# Patient Record
(demographics unavailable — no encounter records)

---

## 2024-11-27 NOTE — CONSULT LETTER
[Dear  ___] : Dear  [unfilled], [Consult Letter:] : I had the pleasure of evaluating your patient, [unfilled]. [Please see my note below.] : Please see my note below. [This report is provisional, pending the completion of the evaluation.  A final diagnosis and plan will follow.] : This report is provisional, pending the completion of the evaluation.  A final diagnosis and plan will follow. [Consult Closing:] : Thank you very much for allowing me to participate in the care of this patient.  If you have any questions, please do not hesitate to contact me. [Sincerely,] : Sincerely, [FreeTextEntry3] : Judi Daugherty MD Attending Physician, Allergy and Immunology , [facility] Department of Medicine and Pediatrics [facility]/Division of Allergy and Immunology   [FreeTextEntry2] : Cresencio Dunaway MD [DrAdriano  ___] : Dr. NAVARRO [DrAdriano ___] : Dr. NAVARRO

## 2024-11-27 NOTE — HISTORY OF PRESENT ILLNESS
[Venom Reactions] : venom reactions [Food Allergies] : food allergies [de-identified] : PMD G[facility] Succcess Rheum Dr Yiemi [facility]to at [facility]  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    Hx of scleroderma, Reynaud's   - started with skin manifestations,  esophageal sx. takes omeprazole BID   ALLERGIC RHINOCONJUNCTIVITIS  no allergy sx. no itchy eyes, rhinorrhea,, nasal congestion, sneezing  had an inhaler when she was 10 yo Rxd montelukast when saw results.    ABNORMAL LUNG CT no cough, wheeze, sob significant respiratory exercise.  basement- dehumidifier, no leaks or floods.  never lived in the space where there was mold.   ATOPIC DERMATITIS  seeing derm previuosly but not recently  dx'd in 2022 with scleroderma. unsure  inside of face feels itchy  recently broke out all over face.   lactose intolerace  AMOXICILLIN ALLERGY  Full body rash - 15 years ago fine, small bumpy rash. no hives.  not with first dose.  got better on its own.

## 2024-11-27 NOTE — DATA REVIEWED
[FreeTextEntry1] : CT chest 1/2024- Patent trachea and bronchi. No bronchiectasis. Unchanged small groundglass nodules along distal airways in both lungs, preferential to the right lower lobe. Unchanged left lower lobe 4 mm solid nodule. No pleural effusion.  PFTs (11/2024) - personally reviewed by me;  mild obstruction, ,mild decrease in DLCO

## 2024-11-27 NOTE — HISTORY OF PRESENT ILLNESS
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    Hx of scleroderma, Reynaud's   - started with skin manifestations,  esophageal sx. takes omeprazole BID   ALLERGIC RHINOCONJUNCTIVITIS  no allergy sx. no itchy eyes, rhinorrhea,, nasal congestion, sneezing  had an inhaler when she was 10 yo Rxd montelukast when saw results.    ABNORMAL LUNG CT no cough, wheeze, sob significant respiratory exercise.  basement- dehumidifier, no leaks or floods.  never lived in the space where there was mold.   ATOPIC DERMATITIS  seeing derm previuosly but not recently  dx'd in 2022 with scleroderma. unsure  inside of face feels itchy  recently broke out all over face.   lactose intolerace  AMOXICILLIN ALLERGY  Full body rash - 15 years ago fine, small bumpy rash. no hives.  not with first dose.  got better on its own.

## 2024-11-27 NOTE — CONSULT LETTER
[Dear  ___] : Dear  [unfilled], [Consult Letter:] : I had the pleasure of evaluating your patient, [unfilled]. [Please see my note below.] : Please see my note below. [This report is provisional, pending the completion of the evaluation.  A final diagnosis and plan will follow.] : This report is provisional, pending the completion of the evaluation.  A final diagnosis and plan will follow. [Consult Closing:] : Thank you very much for allowing me to participate in the care of this patient.  If you have any questions, please do not hesitate to contact me. [Sincerely,] : Sincerely, [FreeTextEntry3] : Judi Daugherty MD Attending Physician, Allergy and Immunology , [facility] Department of Medicine and Pediatrics [facility]/Division of Allergy and Immunology   [FreeTextEntry2] : Cresencio Dunaway MD [DrAdriano  ___] : Dr. NAVARRO [DrAdriano ___] : Dr. NAVARRO

## 2024-11-27 NOTE — IMPRESSION
[_____] : grasses ([unfilled]) [Allergy Testing Mixed Feathers] : feathers [Allergy Testing Cockroach] : cockroach

## 2024-11-27 NOTE — REASON FOR VISIT
[Initial Consultation] : an initial consultation for [FreeTextEntry2] : elevated eosinophils, elevated IgE level, allergic rhinoconjunctivitis

## 2024-11-27 NOTE — SOCIAL HISTORY
[House] : [unfilled] lives in a house  [Central Forced Air] : heating provided by central forced air [Central] : air conditioning provided by central unit [Damp/Musty] : damp/musty [None] : none [Humidifier] : does not use a humidifier [Cockroaches] : Patient states that there are no cockroaches in the home [Dust Mite Covers] : does not have dust mite covers [Feather Pillows] : does not have feather pillows [Feather Comforter] : does not have a feather comforter [Bedroom] : not in the bedroom

## 2025-01-28 NOTE — HISTORY OF PRESENT ILLNESS
[de-identified] : Verbal consent given on 01/28/2025 at 01:31 PM  by  ALFONSO MONTILLA .    Things are status quo. Have been taking medication. Finished 30 days, and there was a lag between refill, and had headaches and nasal congestion. Takes Zyrtec daily, Flonase if she remembers. Prone to headaches and migraines and haven't had any since on Zyrtec.  Supposed to see Dr. Prabhakar next week to discuss plan. Had chest CT and echo a few weeks ago.

## 2025-01-28 NOTE — DATA REVIEWED
[FreeTextEntry1] : 1/2025 echo  1. Left ventricular systolic function is normal with an ejection fraction of 65 % by visual interpretation visually estimated at 60 to 65 %. 2. Mild mitral regurgitation. 3. Minimal to mild tricuspid regurgitation. 4. Estimated pulmonary artery systolic pressure is 26 mmHg. 5. Minimal pulmonic regurgitation. 6. Thinning of the inter-atrial septum.  1/2025 Chest CT  stable GGO

## 2025-01-28 NOTE — REASON FOR VISIT
[Routine Follow-Up] : a routine follow-up visit for [FreeTextEntry2] : allergic rhinoconjunctivitis, abnormal CT with positive hypersensitivity panel, eosinophilia and elevated IgE

## 2025-02-03 NOTE — REASON FOR VISIT
[Home] : at home, [unfilled] , at the time of the visit. [Medical Office: ([facility])___] : at the medical office located in  [Patient] : the patient [Follow-Up] : a follow-up visit [Shortness of Breath] : shortness of breath

## 2025-02-03 NOTE — REASON FOR VISIT
[Home] : at home, [unfilled] , at the time of the visit. [Medical Office: ([facility])___] : at the medical office located in  [Patient] : the patient [Follow-Up] : a follow-up visit [Shortness of Breath] : shortness of breath

## 2025-02-04 NOTE — END OF VISIT
[FreeTextEntry3] :   I, Dr. Vicky Prabhakar  personally performed the evaluation and management (E/M) services for this established patient who presents today with (a) new problem(s)/exacerbation of (an) existing condition(s). That E/M includes conducting the clinically appropriate interval history &/or exam, assessing all new/exacerbated conditions, and establishing a new plan of care. Today, my MARIAELENA, Ana Rosa Art NP, , was here to observe my evaluation and management service for this new problem/exacerbated condition and follow the plan of care established by me going forward. [Time Spent: ___ minutes] : I have spent [unfilled] minutes of time on the encounter which excludes teaching and separately reported services.

## 2025-02-04 NOTE — REVIEW OF SYSTEMS
[GERD] : gerd [Raynaud] : raynaud [Fever] : no fever [Dry Eyes] : no dry eyes [Cough] : no cough [Chest Discomfort] : no chest discomfort [Hay Fever] : no hay fever [Nocturia] : no nocturia [Arthralgias] : no arthralgias [Headache] : no headache [Depression] : no depression [Diabetes] : no diabetes

## 2025-02-04 NOTE — DISCUSSION/SUMMARY
[FreeTextEntry1] : ---Assessment plan----------The patient has been referred here for further opinion regarding pulmonary problem,  40-year-old lady history of scleroderma on nifedipine and myfortic, ILD  #SCLERODERMA PULMONARY INVOLVEMENT-----  CT 2025---No CT evidence of pulmonary fibrosis. Few small bilateral lower lobe nodular opacities are unchanged. We will continue to monitor with yearly CT scans  2---HAS EOSININOPHILIA AND ELEVATED  IgE .--ALSO HAS INDETERMINATE ANCA ---____ significance of this is not clear ---but I have asked her to discuss it with Dr. Aburto  her rheumatologist regarding possibility of EGPA [ Eosinophilic granulomatosis with polyangiitis (EGPA)   3]#scleroderma  --follows Dr Aburto every 6 m- on myfortic and nifedipine  4]-  #Chronic rhinitis  ---Pt complaining of allergic symptoms/congestion, rhinitis -- has some relief with daily zyrtec  --- ---HAS EOSININOPHILIA AND ELEVATED  IgE .--ALSO HAS INDETYERMINATE ANCA ---____ significance of this is not clear but I have asked her to discuss it with  Dr. Aburto her rheumatologist regarding possibility of EGPA [ Eosinophilic granulomatosis with polyangiitis (EGPA)   ---Will get CT sinuses given chronic rhinitis  ---Asthma profile in office Continue with zyrtec daily  RTO in 3-4 months for follow up  Thanks for allowing  me to participate  in the care of this patient.  Patient at this time  will follow  the above mentioned recommendations and return back for follow up visit. If you have any questions  I can be reached  at # [phone] (office).   Vicky Prabhakar MD, [facility]

## 2025-02-04 NOTE — PHYSICAL EXAM
[No Acute Distress] : no acute distress [Normal Oropharynx] : normal oropharynx [III] : Mallampati Class: III [No Neck Mass] : no neck mass [Normal S1, S2] : normal s1, s2 [No Abnormalities] : no abnormalities [Benign] : benign [Normal Gait] : normal gait [No Cyanosis] : no cyanosis [Normal Color/ Pigmentation] : normal color/ pigmentation [No Focal Deficits] : no focal deficits [Oriented x3] : oriented x3 [TextBox_68] : Decreased entry at bases

## 2025-02-04 NOTE — HISTORY OF PRESENT ILLNESS
[Never] : never [TextBox_4] : This letter  is regarding your patient  who  attended pulmonary out patient office today.  I have reviewed  patient's  past history, social history, family history and medication list. I also  reviewed nurse practitioners/ and fellows  notes and assessment and agree with it.   The patient was referred by Dr. ABURTO RHEUMATOLOGY  40-year-old lady history of scleroderma------------- with with CT scan suggestive of minimal ILD ------No history of , fever, chills , rigors, chest pain, or hemoptysis. Questionable history of Raynaud's phenomenon. No h/o significant weight loss in last few months. No history of liver dysfunction , collagen vascular disorder or chronic thromboembolic disease. I would classify the patient's dyspnea as WHO  FUNCTIONAL CLASS II--------  ----Echo  date-----[facility] moderate mitral regurgitation ----Pft date---------2022 pending --6MWT 440 METERS ---  CT CHEST - ORDERED BY: SARAH ABURTO PROCEDURE DATE: 09/03/2022 INTERPRETATION: CLINICAL INFORMATION: Scleroderma PROCEDURE: CT scan of the chest was obtained without intravenous contrast. FINDINGS: MEDIASTINUM/LYMPH NODES: No lymphadenopathy. The esophagus is mildly dilated throughout its course. HEART/VASCULATURE: The heart is normal in size. The aorta and pulmonary artery are normal in caliber. No coronary arterial calcification AIRWAYS/LUNGS/PLEURA: The central airways are patent. No pleural effusions. There are faint reticular opacities in the upper lobes anteriorly as well as small groundglass nodules in the dependent lower lobes. No bronchiectasis or honeycombing. UPPER ABDOMEN: Within normal limits. 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 Partially imaged posterior lumbar spinal fusion hardware. IMPRESSION: Faint reticular opacities in the upper lobes in a nondependent distribution; while nonspecific, question mild/early interstitial lung disease in this clinical setting. Attention on follow-up imaging.  Small dependent basilar groundglass opacities; while nonspecific, question aspiration in the setting of a patulous esophagus in this patient with reported scleroderma.   CT CHEST 1/2024 LUNGS/AIRWAYS/PLEURA: Patent trachea and bronchi. No bronchiectasis. Unchanged small groundglass nodules along distal airways in both lungs, preferential to the right lower lobe. Unchanged left lower lobe 4 mm solid nodule. No pleural effusion.----  CT 2025---No CT evidence of pulmonary fibrosis. Few small bilateral lower lobe nodular opacities are unchanged.     9/7: Exercise tolerance has been good. She increased her omeprazole to 40mg BID. Taking Mycophenolate takes 4 tablets a day. Systemically tolerating well. Taking nifedipine for Raynauds. Has not been taking the calcium because she has been having trouble taking it.  Needs exercise echo----   3/2024 scleroderma- follow heum Dr Aburto- on Nifedipine and Myfortic (5 tabs daily- unable to tolerate ordered 6 tab) No pulm complaints Follws GI Dr Reid annually- on pantoprazole but feels that its no longer working- c/o nausea Currently working as , has 3 young children at home--- no complaints from pulmonary point of view-----advised B12 and vitamin D supplementation---    02/2025 Scleroderma - following with rheum Dr. Aburto -- on Nifedipine and Myfortic  ----No significant pulmonary complaints Pt complaining of allergic symptoms/congestion, rhinitis -- has some relief with daily zyrtec ---HAS EOSININOPHILIA AND ELEVATED  IgE .--ALSO HAS INDETYERMINATE ANCA ---____ significance of this is not clear but I have asked her to discuss it with Dr. Baron and her rheumatologist regarding possibility of EGPA [ Eosinophilic granulomatosis with polyangiitis (EGPA)

## 2025-02-04 NOTE — DISCUSSION/SUMMARY
[FreeTextEntry1] : ---Assessment plan----------The patient has been referred here for further opinion regarding pulmonary problem,  40-year-old lady history of scleroderma on nifedipine and myfortic, ILD  #SCLERODERMA PULMONARY INVOLVEMENT-----  CT 2025---No CT evidence of pulmonary fibrosis. Few small bilateral lower lobe nodular opacities are unchanged. We will continue to monitor with yearly CT scans  2---HAS EOSININOPHILIA AND ELEVATED  IgE .--ALSO HAS INDETERMINATE ANCA ---____ significance of this is not clear ---but I have asked her to discuss it with Dr. Aburto  her rheumatologist regarding possibility of EGPA [ Eosinophilic granulomatosis with polyangiitis (EGPA)   3]#scleroderma  --follows Dr Aburto every 6 m- on myfortic and nifedipine  4]-  #Chronic rhinitis  ---Pt complaining of allergic symptoms/congestion, rhinitis -- has some relief with daily zyrtec  --- ---HAS EOSININOPHILIA AND ELEVATED  IgE .--ALSO HAS INDETYERMINATE ANCA ---____ significance of this is not clear but I have asked her to discuss it with  Dr. Aburto her rheumatologist regarding possibility of EGPA [ Eosinophilic granulomatosis with polyangiitis (EGPA)   ---Will get CT sinuses given chronic rhinitis  ---Asthma profile in office Continue with zyrtec daily  RTO in 3-4 months for follow up  Thanks for allowing  me to participate  in the care of this patient.  Patient at this time  will follow  the above mentioned recommendations and return back for follow up visit. If you have any questions  I can be reached  at # [phone] (office).   Vicky Prabhakar MD, [facility]

## 2025-03-20 NOTE — HISTORY OF PRESENT ILLNESS
[FreeTextEntry1] : 41 year old presents for an annual. She is doing well and has no complaints. Pt's  has a vasectomy.  OBHx: c/s at [facility],  x2 PMHx: -  w scleroderma, Raynaud's   [Mammogramdate] : 03/24 [PapSmeardate] : 02/24

## 2025-03-20 NOTE — PLAN
[FreeTextEntry1] : 41 year old for an annual.  HCM -Pap done today -Rx mammo -RTO 1 year - vasectomy

## 2025-03-20 NOTE — END OF VISIT
[FreeTextEntry3] : I, Jane Burk, acted as a scribe on behalf of Dr. Yue Saab M.D. on 03/20/2025.   All medical entries made by the scribe were at my Dr. Yue Saab M.D direction and personally dictated by me on 03/20/2025. I have reviewed the chart and agree that the record accurately reflects my personal performance of the history, physical exam, assessment and plan. I have also personally directed, reviewed, and agreed with the chart.

## 2025-05-08 NOTE — REVIEW OF SYSTEMS
[Fever] : no fever [Dry Eyes] : no dry eyes [Cough] : no cough [Chest Discomfort] : no chest discomfort [Hay Fever] : no hay fever [GERD] : gerd [Nocturia] : no nocturia [Arthralgias] : no arthralgias [Raynaud] : raynaud [Headache] : no headache [Depression] : no depression [Diabetes] : no diabetes

## 2025-05-08 NOTE — DISCUSSION/SUMMARY
[FreeTextEntry1] : ---Assessment plan----------The patient has been referred here for further opinion regarding pulmonary problem,  40-year-old lady history of scleroderma on nifedipine and myfortic, ILD  #SCLERODERMA PULMONARY INVOLVEMENT-----  CT 2025---No CT evidence of pulmonary fibrosis. Few small bilateral lower lobe nodular opacities are unchanged. We will continue to monitor with yearly CT scans  2---HAS EOSININOPHILIA AND ELEVATED  IgE .--ALSO HAS INDETERMINATE ANCA ---____ significance of this is not clear ---but I have asked her to discuss it with Dr. Aburto  her rheumatologist regarding possibility of EGPA [ Eosinophilic granulomatosis with polyangiitis (EGPA)   3]#scleroderma  --follows Dr Aburto every 6 m- on myfortic and nifedipine  4]-  #Chronic rhinitis  ---Pt complaining of allergic symptoms/congestion, rhinitis -- has some relief with daily zyrtec  --- ---HAS EOSININOPHILIA AND ELEVATED  IgE .--ALSO HAS INDETYERMINATE ANCA ---____ significance of this is not clear but I have asked her to discuss it with  Dr. Aburto her rheumatologist regarding possibility of EGPA [ Eosinophilic granulomatosis with polyangiitis (EGPA)   ---Will get CT sinuses given chronic rhinitis  ---Asthma profile in office Continue with zyrtec daily  RTO in 3-4 months for follow up  Thanks for allowing  me to participate  in the care of this patient.  Patient at this time  will follow  the above mentioned recommendations and return back for follow up visit. If you have any questions  I can be reached  at # [phone] (office).   Vicky Prabhakar MD, [facility]